# Patient Record
(demographics unavailable — no encounter records)

---

## 2025-03-01 NOTE — REVIEW OF SYSTEMS
[Fever] : no fever [Night Sweats] : no night sweats [Discharge] : no discharge [Vision Problems] : no vision problems [Nasal Discharge] : no nasal discharge [Chest Pain] : no chest pain [Orthopena] : no orthopnea [Shortness Of Breath] : no shortness of breath [Abdominal Pain] : no abdominal pain [Vomiting] : no vomiting [Dysuria] : no dysuria [Joint Pain] : no joint pain [Back Pain] : no back pain [Itching] : no itching [Skin Rash] : no skin rash [Headache] : no headache [Memory Loss] : no memory loss [Suicidal] : not suicidal [Easy Bleeding] : no easy bleeding [FreeTextEntry4] : nasal congestion

## 2025-03-01 NOTE — HEALTH RISK ASSESSMENT
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

## 2025-03-01 NOTE — HEALTH RISK ASSESSMENT
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

## 2025-03-01 NOTE — HISTORY OF PRESENT ILLNESS
[FreeTextEntry1] : Establish care, nasal congestion [de-identified] : 33 year old male here with complaints of nasal congestion for over a year. He uses Sinex nasal spray with relief.  Currently he denies any chest pain, shortness of breath or cough.

## 2025-03-01 NOTE — PLAN
[FreeTextEntry1] : Nasal congestion - trial of flonase/zyrtec Screening for metabolic conditions - will check labs

## 2025-03-01 NOTE — HISTORY OF PRESENT ILLNESS
[FreeTextEntry1] : Establish care, nasal congestion [de-identified] : 33 year old male here with complaints of nasal congestion for over a year. He uses Sinex nasal spray with relief.  Currently he denies any chest pain, shortness of breath or cough.